# Patient Record
Sex: FEMALE | Race: WHITE | NOT HISPANIC OR LATINO | Employment: OTHER | URBAN - METROPOLITAN AREA
[De-identification: names, ages, dates, MRNs, and addresses within clinical notes are randomized per-mention and may not be internally consistent; named-entity substitution may affect disease eponyms.]

---

## 2019-09-18 ENCOUNTER — PATIENT OUTREACH (OUTPATIENT)
Dept: ADMINISTRATIVE | Facility: HOSPITAL | Age: 19
End: 2019-09-18

## 2019-09-20 ENCOUNTER — OFFICE VISIT (OUTPATIENT)
Dept: FAMILY MEDICINE | Facility: CLINIC | Age: 19
End: 2019-09-20
Payer: MEDICARE

## 2019-09-20 ENCOUNTER — HOSPITAL ENCOUNTER (OUTPATIENT)
Dept: CARDIOLOGY | Facility: HOSPITAL | Age: 19
Discharge: HOME | End: 2019-09-20
Attending: NURSE PRACTITIONER
Payer: MEDICARE

## 2019-09-20 VITALS
SYSTOLIC BLOOD PRESSURE: 106 MMHG | HEIGHT: 68 IN | OXYGEN SATURATION: 97 % | WEIGHT: 134.63 LBS | HEART RATE: 67 BPM | BODY MASS INDEX: 20.4 KG/M2 | RESPIRATION RATE: 18 BRPM | DIASTOLIC BLOOD PRESSURE: 80 MMHG

## 2019-09-20 DIAGNOSIS — R01.1 MURMUR, HEART: ICD-10-CM

## 2019-09-20 DIAGNOSIS — Z13.6 ENCOUNTER FOR LIPID SCREENING FOR CARDIOVASCULAR DISEASE: ICD-10-CM

## 2019-09-20 DIAGNOSIS — Z13.220 LIPID SCREENING: ICD-10-CM

## 2019-09-20 DIAGNOSIS — R53.83 FATIGUE, UNSPECIFIED TYPE: Primary | ICD-10-CM

## 2019-09-20 DIAGNOSIS — F31.9 BIPOLAR 1 DISORDER: ICD-10-CM

## 2019-09-20 DIAGNOSIS — Z13.220 ENCOUNTER FOR LIPID SCREENING FOR CARDIOVASCULAR DISEASE: ICD-10-CM

## 2019-09-20 DIAGNOSIS — E55.9 VITAMIN D DEFICIENCY: ICD-10-CM

## 2019-09-20 PROCEDURE — 93005 ELECTROCARDIOGRAM TRACING: CPT

## 2019-09-20 PROCEDURE — 99214 PR OFFICE/OUTPT VISIT, EST, LEVL IV, 30-39 MIN: ICD-10-PCS | Mod: S$GLB,,, | Performed by: NURSE PRACTITIONER

## 2019-09-20 PROCEDURE — 99214 OFFICE O/P EST MOD 30 MIN: CPT | Mod: S$GLB,,, | Performed by: NURSE PRACTITIONER

## 2019-09-20 NOTE — PROGRESS NOTES
"  Chief Complaint  Chief Complaint   Patient presents with    [facility]     check up     HPI:  Aaliyah Griggs is a 19 y.o. female with medical diagnoses as listed and reviewed within the medical history and problem list that presents to [facility]. History of bipolar 1. She sees a therapist but is not medicated. She is going to make appointment with Psychiatrist at that clinic for possible medication management. She has taken medication in the past but has been off for over a year. She is reporting "rapid cycle kameron and depression" that she manages with marijuana. She is aware that this worsens her depression but "it helps with her anxiety". She admits to feeling depressed for the last year. States, "I just handle it". Denies suicidal or homicidal ideations. Although she admits to homicidal ideation of "running over anyone driving a motorcycle" after taking Saphris. Denies at this time. She has no c/o pain or discomfort. No chest pain or SOB. She has no other medical history and takes no medication.     PAST MEDICAL HISTORY:  Past Medical History:   Diagnosis Date    Anxiety     Bipolar disorder     PTSD (post-traumatic stress disorder)        PAST SURGICAL HISTORY:  History reviewed. No pertinent surgical history.    SOCIAL HISTORY:  Social History     Socioeconomic History    Marital status: Single     Spouse name: Not on file    Number of children: Not on file    Years of education: Not on file    Highest education level: Not on file   Occupational History    Not on file   Social Needs    Financial resource strain: Not on file    Food insecurity:     Worry: Not on file     Inability: Not on file    Transportation needs:     Medical: Not on file     Non-medical: Not on file   Tobacco Use    Smoking status: Never Smoker    Smokeless tobacco: Never Used   Substance and Sexual Activity    Alcohol use: Yes    Drug use: Yes     Types: Marijuana    Sexual activity: Not on file   Lifestyle " "   Physical activity:     Days per week: Not on file     Minutes per session: Not on file    Stress: Not on file   Relationships    Social connections:     Talks on phone: Not on file     Gets together: Not on file     Attends Christian service: Not on file     Active member of club or organization: Not on file     Attends meetings of clubs or organizations: Not on file     Relationship status: Not on file   Other Topics Concern    Not on file   Social History Narrative    Not on file       FAMILY HISTORY:  History reviewed. No pertinent family history.    ALLERGIES AND MEDICATIONS: updated and reviewed.  Review of patient's allergies indicates:   Allergen Reactions    Amoxicillin (bulk) Hives     No current outpatient medications on file.     No current facility-administered medications for this visit.          ROS  Review of Systems   Constitutional: Negative for appetite change, chills, fatigue and fever.   HENT: Negative for congestion, postnasal drip, rhinorrhea and sore throat.    Respiratory: Negative for cough, chest tightness, shortness of breath and wheezing.    Cardiovascular: Negative for chest pain and palpitations.   Gastrointestinal: Negative for abdominal distention, abdominal pain, constipation, diarrhea, nausea and vomiting.   Genitourinary: Negative for dysuria.   Musculoskeletal: Negative for myalgias.   Skin: Negative for color change and rash.   Neurological: Negative for dizziness, weakness and headaches.   Psychiatric/Behavioral: Positive for decreased concentration, dysphoric mood and sleep disturbance. Negative for confusion, self-injury and suicidal ideas. The patient is nervous/anxious and is hyperactive.            PHYSICAL EXAM  Vitals:    09/20/19 1029   BP: 106/80   BP Location: Right arm   Patient Position: Sitting   Pulse: 67   Resp: 18   SpO2: 97%   Weight: 61.1 kg (134 lb 9.6 oz)   Height: 5' 8" (1.727 m)    Body mass index is 20.47 kg/m².  Weight: 61.1 kg (134 lb 9.6 oz) " "  Height: 5' 8" (172.7 cm)       Physical Exam   Constitutional: She is oriented to person, place, and time. She appears well-developed and well-nourished.   HENT:   Head: Normocephalic.   Eyes: Pupils are equal, round, and reactive to light.   Neck: Normal range of motion. Neck supple.   Cardiovascular: Normal rate, regular rhythm, S1 normal and S2 normal.   No murmur heard.  Pulmonary/Chest: Effort normal and breath sounds normal. She has no wheezes.   Abdominal: Soft. Normal appearance and bowel sounds are normal. She exhibits no distension. There is no tenderness.   Musculoskeletal: Normal range of motion.   Neurological: She is alert and oriented to person, place, and time.   Skin: Skin is warm and dry. Capillary refill takes less than 2 seconds.   Psychiatric: Thought content normal. Her mood appears anxious. Her speech is rapid and/or pressured and tangential. She is hyperactive. Cognition and memory are normal. She expresses impulsivity. She exhibits a depressed mood. She expresses no homicidal and no suicidal ideation.   Pt shows various signs of depression with kameron with irritability.    Vitals reviewed.        Health Maintenance       Date Due Completion Date    Lipid Panel 1998 ---    HPV Vaccines (1 - Female 3-dose series) 02/06/2013 ---    TETANUS VACCINE 02/06/2016 ---    Pap Smear 02/06/2019 ---    Influenza Vaccine (1) 09/01/2019 ---               Assessment & Plan    Aaliyah was seen today for establish care.    Diagnoses and all orders for this visit:    Fatigue, unspecified type  -     Lipid panel; Future  -     Comprehensive metabolic panel; Future  -     CBC auto differential; Future  -     TSH; Future    Lipid screening    Vitamin D deficiency  -     Vitamin D; Future    Bipolar 1 disorder  -     Lipid panel; Future  -     Comprehensive metabolic panel; Future  -     CBC auto differential; Future  -     TSH; Future  -     Vitamin D; Future    Encounter for lipid screening for " cardiovascular disease  -     Lipid panel; Future    Murmur, heart  -     Echo Color Flow Doppler? Yes; Future  -     SCHEDULED EKG 12-LEAD (to Muse); Future        Follow-up: Follow up in about 6 months (around 3/20/2020).      Risks, benefits, and side effects were discussed with the patient. All questions were answered to the fullest satisfaction of the patient, and pt verbalized understanding and agreement to treatment plan. Pt was to call with any new or worsening symptoms, or present to the ER.

## 2019-10-01 ENCOUNTER — HOSPITAL ENCOUNTER (OUTPATIENT)
Dept: CARDIOLOGY | Facility: HOSPITAL | Age: 19
Discharge: HOME | End: 2019-10-01
Attending: NURSE PRACTITIONER
Payer: MEDICARE

## 2019-10-01 VITALS — BODY MASS INDEX: 20.31 KG/M2 | HEIGHT: 68 IN | WEIGHT: 134 LBS

## 2019-10-01 DIAGNOSIS — R01.1 MURMUR, HEART: ICD-10-CM

## 2019-10-01 PROCEDURE — 93306 TTE W/DOPPLER COMPLETE: CPT

## 2019-10-01 PROCEDURE — 93306 TTE W/DOPPLER COMPLETE: CPT | Mod: 26,,, | Performed by: INTERNAL MEDICINE

## 2019-10-01 PROCEDURE — 93306 ECHO (CUPID ONLY): ICD-10-PCS | Mod: 26,,, | Performed by: INTERNAL MEDICINE

## 2019-10-02 LAB
AORTIC VALVE CUSP SEPERATION: 2 CM
AV INDEX (PROSTH): 0.68
AV PEAK GRADIENT: 4 MMHG
AV VALVE AREA: 2.14 CM2
AV VELOCITY RATIO: 0.8
BSA FOR ECHO PROCEDURE: 1.71 M2
CV ECHO LV RWT: 0.39 CM
DOP CALC AO PEAK VEL: 1 M/S
DOP CALC AO VTI: 25.1 CM
DOP CALC LVOT AREA: 3.1 CM2
DOP CALC LVOT DIAMETER: 2 CM
DOP CALC LVOT PEAK VEL: 0.8 M/S
DOP CALC LVOT STROKE VOLUME: 53.69 CM3
DOP CALC MV VTI: 61 CM
DOP CALCLVOT PEAK VEL VTI: 17.1 CM
ECHO LV POSTERIOR WALL: 0.9 CM (ref 0.6–1.1)
FRACTIONAL SHORTENING: 33 % (ref 28–44)
HR TR ECHO: 70 BPM
INTERVENTRICULAR SEPTUM: 0.9 CM (ref 0.6–1.1)
LA MAJOR: 33.66 CM
LA WIDTH: 31.77 CM
LEFT ATRIUM SIZE: 2.4 CM
LEFT INTERNAL DIMENSION IN SYSTOLE: 3.1 CM (ref 2.1–4)
LEFT VENTRICLE DIASTOLIC VOLUME INDEX: 56.46 ML/M2
LEFT VENTRICLE DIASTOLIC VOLUME: 97.34 ML
LEFT VENTRICLE MASS INDEX: 80 G/M2
LEFT VENTRICLE SYSTOLIC VOLUME INDEX: 22 ML/M2
LEFT VENTRICLE SYSTOLIC VOLUME: 37.92 ML
LEFT VENTRICULAR INTERNAL DIMENSION IN DIASTOLE: 4.6 CM (ref 3.5–6)
LEFT VENTRICULAR MASS: 137.72 G
MV MEAN GRADIENT: 14 MMHG
MV PEAK GRADIENT: 40 MMHG
MV STENOSIS PRESSURE HALF TIME: 68 MS
MV VALVE AREA BY CONTINUITY EQUATION: 0.88 CM2
MV VALVE AREA P 1/2 METHOD: 3.24 CM2
PISA TR MAX VEL: 2.35 M/S
PV MEAN GRADIENT: 1 MMHG
PV PEAK VELOCITY: 0.8 CM/S
RA MAJOR: 35.31 CM
RA PRESSURE: 3 MMHG
RA WIDTH: 33.9 CM
RIGHT VENTRICULAR END-DIASTOLIC DIMENSION: 2.5 CM
TDI LATERAL: 0.13 M/S
TDI SEPTAL: 0.08 M/S
TDI: 0.11 M/S
TR MAX PG: 22 MMHG
TRICUSPID ANNULAR PLANE SYSTOLIC EXCURSION: 2 CM
TV REST PULMONARY ARTERY PRESSURE: 25 MMHG

## 2019-10-03 ENCOUNTER — TELEPHONE (OUTPATIENT)
Dept: FAMILY MEDICINE | Facility: CLINIC | Age: 19
End: 2019-10-03

## 2019-10-03 NOTE — TELEPHONE ENCOUNTER
----- Message from Yanira Urbina NP sent at 10/3/2019  8:28 AM CDT -----  Will you please let her know that her echo and ekg are both normal. Thanks!